# Patient Record
Sex: MALE | Race: WHITE | NOT HISPANIC OR LATINO | Employment: FULL TIME | URBAN - METROPOLITAN AREA
[De-identification: names, ages, dates, MRNs, and addresses within clinical notes are randomized per-mention and may not be internally consistent; named-entity substitution may affect disease eponyms.]

---

## 2023-06-01 ENCOUNTER — OFFICE VISIT (OUTPATIENT)
Dept: INTERNAL MEDICINE | Facility: CLINIC | Age: 43
End: 2023-06-01
Payer: MEDICAID

## 2023-06-01 VITALS
BODY MASS INDEX: 20.41 KG/M2 | SYSTOLIC BLOOD PRESSURE: 120 MMHG | TEMPERATURE: 97.3 F | OXYGEN SATURATION: 96 % | HEART RATE: 61 BPM | WEIGHT: 154 LBS | DIASTOLIC BLOOD PRESSURE: 72 MMHG | HEIGHT: 73 IN

## 2023-06-01 DIAGNOSIS — Z13.29 SCREENING FOR THYROID DISORDER: ICD-10-CM

## 2023-06-01 DIAGNOSIS — Z00.00 ENCOUNTER FOR MEDICAL EXAMINATION TO ESTABLISH CARE: Primary | ICD-10-CM

## 2023-06-01 DIAGNOSIS — F17.219 CIGARETTE NICOTINE DEPENDENCE WITH NICOTINE-INDUCED DISORDER: ICD-10-CM

## 2023-06-01 DIAGNOSIS — F33.0 MILD EPISODE OF RECURRENT MAJOR DEPRESSIVE DISORDER: ICD-10-CM

## 2023-06-01 DIAGNOSIS — Z11.59 NEED FOR HEPATITIS C SCREENING TEST: ICD-10-CM

## 2023-06-01 DIAGNOSIS — Z00.00 ANNUAL PHYSICAL EXAM: ICD-10-CM

## 2023-06-01 DIAGNOSIS — Z71.6 ENCOUNTER FOR TOBACCO USE CESSATION COUNSELING: ICD-10-CM

## 2023-06-01 DIAGNOSIS — K21.9 GASTROESOPHAGEAL REFLUX DISEASE WITHOUT ESOPHAGITIS: ICD-10-CM

## 2023-06-01 DIAGNOSIS — Z13.220 SCREENING FOR LIPID DISORDERS: ICD-10-CM

## 2023-06-01 DIAGNOSIS — J43.9 PULMONARY EMPHYSEMA, UNSPECIFIED EMPHYSEMA TYPE: ICD-10-CM

## 2023-06-01 LAB
ALBUMIN SERPL-MCNC: 4.5 G/DL (ref 3.5–5.2)
ALBUMIN/GLOB SERPL: 1.9 G/DL
ALP SERPL-CCNC: 76 U/L (ref 39–117)
ALT SERPL W P-5'-P-CCNC: 10 U/L (ref 1–41)
ANION GAP SERPL CALCULATED.3IONS-SCNC: 12.8 MMOL/L (ref 5–15)
AST SERPL-CCNC: 17 U/L (ref 1–40)
BASOPHILS # BLD AUTO: 0.05 10*3/MM3 (ref 0–0.2)
BASOPHILS NFR BLD AUTO: 0.3 % (ref 0–1.5)
BILIRUB SERPL-MCNC: 0.3 MG/DL (ref 0–1.2)
BUN SERPL-MCNC: 14 MG/DL (ref 6–20)
BUN/CREAT SERPL: 18.9 (ref 7–25)
CALCIUM SPEC-SCNC: 9.7 MG/DL (ref 8.6–10.5)
CHLORIDE SERPL-SCNC: 102 MMOL/L (ref 98–107)
CHOLEST SERPL-MCNC: 206 MG/DL (ref 0–200)
CO2 SERPL-SCNC: 21.2 MMOL/L (ref 22–29)
CREAT SERPL-MCNC: 0.74 MG/DL (ref 0.76–1.27)
DEPRECATED RDW RBC AUTO: 41.4 FL (ref 37–54)
EGFRCR SERPLBLD CKD-EPI 2021: 114.6 ML/MIN/1.73
EOSINOPHIL # BLD AUTO: 0.02 10*3/MM3 (ref 0–0.4)
EOSINOPHIL NFR BLD AUTO: 0.1 % (ref 0.3–6.2)
ERYTHROCYTE [DISTWIDTH] IN BLOOD BY AUTOMATED COUNT: 12 % (ref 12.3–15.4)
GLOBULIN UR ELPH-MCNC: 2.4 GM/DL
GLUCOSE SERPL-MCNC: 98 MG/DL (ref 65–99)
HCT VFR BLD AUTO: 42.9 % (ref 37.5–51)
HDLC SERPL-MCNC: 33 MG/DL (ref 40–60)
HGB BLD-MCNC: 14.9 G/DL (ref 13–17.7)
IMM GRANULOCYTES # BLD AUTO: 0.06 10*3/MM3 (ref 0–0.05)
IMM GRANULOCYTES NFR BLD AUTO: 0.4 % (ref 0–0.5)
LDLC SERPL CALC-MCNC: 146 MG/DL (ref 0–100)
LDLC/HDLC SERPL: 4.35 {RATIO}
LYMPHOCYTES # BLD AUTO: 2.24 10*3/MM3 (ref 0.7–3.1)
LYMPHOCYTES NFR BLD AUTO: 15.6 % (ref 19.6–45.3)
MCH RBC QN AUTO: 32.9 PG (ref 26.6–33)
MCHC RBC AUTO-ENTMCNC: 34.7 G/DL (ref 31.5–35.7)
MCV RBC AUTO: 94.7 FL (ref 79–97)
MONOCYTES # BLD AUTO: 0.62 10*3/MM3 (ref 0.1–0.9)
MONOCYTES NFR BLD AUTO: 4.3 % (ref 5–12)
NEUTROPHILS NFR BLD AUTO: 11.37 10*3/MM3 (ref 1.7–7)
NEUTROPHILS NFR BLD AUTO: 79.3 % (ref 42.7–76)
NRBC BLD AUTO-RTO: 0 /100 WBC (ref 0–0.2)
PLATELET # BLD AUTO: 170 10*3/MM3 (ref 140–450)
PMV BLD AUTO: 11.5 FL (ref 6–12)
POTASSIUM SERPL-SCNC: 4.3 MMOL/L (ref 3.5–5.2)
PROT SERPL-MCNC: 6.9 G/DL (ref 6–8.5)
RBC # BLD AUTO: 4.53 10*6/MM3 (ref 4.14–5.8)
SODIUM SERPL-SCNC: 136 MMOL/L (ref 136–145)
TRIGL SERPL-MCNC: 148 MG/DL (ref 0–150)
TSH SERPL DL<=0.05 MIU/L-ACNC: 0.53 UIU/ML (ref 0.27–4.2)
VLDLC SERPL-MCNC: 27 MG/DL (ref 5–40)
WBC NRBC COR # BLD: 14.36 10*3/MM3 (ref 3.4–10.8)

## 2023-06-01 PROCEDURE — 80053 COMPREHEN METABOLIC PANEL: CPT | Performed by: NURSE PRACTITIONER

## 2023-06-01 PROCEDURE — 80061 LIPID PANEL: CPT | Performed by: NURSE PRACTITIONER

## 2023-06-01 PROCEDURE — 86803 HEPATITIS C AB TEST: CPT | Performed by: NURSE PRACTITIONER

## 2023-06-01 PROCEDURE — 84443 ASSAY THYROID STIM HORMONE: CPT | Performed by: NURSE PRACTITIONER

## 2023-06-01 PROCEDURE — 85025 COMPLETE CBC W/AUTO DIFF WBC: CPT | Performed by: NURSE PRACTITIONER

## 2023-06-01 RX ORDER — NICOTINE 21-14-7MG
1 KIT TRANSDERMAL TAKE AS DIRECTED
Qty: 56 EACH | Refills: 0 | Status: SHIPPED | OUTPATIENT
Start: 2023-06-01

## 2023-06-01 RX ORDER — OMEPRAZOLE 20 MG/1
20 CAPSULE, DELAYED RELEASE ORAL DAILY
Qty: 90 CAPSULE | Refills: 1 | Status: SHIPPED | OUTPATIENT
Start: 2023-06-01

## 2023-06-01 RX ORDER — BUDESONIDE AND FORMOTEROL FUMARATE DIHYDRATE 80; 4.5 UG/1; UG/1
2 AEROSOL RESPIRATORY (INHALATION)
Qty: 10.2 G | Refills: 0 | Status: SHIPPED | OUTPATIENT
Start: 2023-06-01

## 2023-06-01 NOTE — PROGRESS NOTES
Chief Complaint  Establish Care, Back Pain, and Neck Pain (Started in the last few days)    Subjective          Wero Mahmood presents to [facility]  Nicotine Dependence  Presents for initial visit. Symptoms include cravings, decreased concentration, fatigue, insomnia and irritability. Symptoms are negative for headache and sore throat. Preferred tobacco types include cigarettes. Preferred cigarette types include filtered. Preferred strength is regular. His urge triggers include company of smokers and contact with substance. He smokes 1 pack of cigarettes per day. He started smoking when he was 11-12 years old. Treatments tried: cold turkey. The treatment provided no relief. Wero is ready to quit.   Depression  Visit Type: initial  Patient presents with the following symptoms: decreased concentration, depressed mood, fatigue, insomnia, irritability and nervousness/anxiety.  Patient is not experiencing: anhedonia, chest pain, choking sensation, compulsions, confusion, dizziness, dry mouth, excessive worry, feelings of hopelessness, feelings of worthlessness, hypersomnia, hyperventilation, impotence, malaise, memory impairment, muscle tension, nausea, obsessions, palpitations, panic, psychomotor agitation, psychomotor retardation, restlessness, shortness of breath, suicidal ideas, suicidal planning, thoughts of death, weight gain and weight loss.   Severity: mild   No history of: anemia    Heartburn  He complains of heartburn. He reports no abdominal pain, no belching, no chest pain, no choking, no coughing, no dysphagia, no early satiety, no globus sensation, no hoarse voice, no nausea, no sore throat, no stridor, no tooth decay, no water brash or no wheezing. This is a chronic problem. The heartburn is located in the substernum. Associated symptoms include fatigue. Pertinent negatives include no anemia, melena, muscle weakness, orthopnea or weight loss.     Previous  "PCP: Can't remember - has not been to PCP in 7 years.   Specialist(s): None  COVID vaccine: Refused  Pneumonia vaccine: Refused   Colon cancer screening: Hx of Colonoscopy - completed 8-9 years ago. Results showed he had colon polyps.     Father passed form lung cancer         Current Outpatient Medications   Medication Instructions    albuterol sulfate  (90 Base) MCG/ACT inhaler 2 puffs, Inhalation, Every 4 Hours PRN    budesonide-formoterol (Symbicort) 80-4.5 MCG/ACT inhaler 2 puffs, Inhalation, 2 Times Daily - RT    Nicotine 21-14-7 MG/24HR kit 1 kit, Transdermal, Take As Directed    nicotine polacrilex (NICORETTE) 4 mg, Mouth/Throat, As Needed    omeprazole (PRILOSEC) 20 mg, Oral, Daily    sertraline (Zoloft) 50 MG tablet Take 1/2 tab daily PO x 5 days then increase to 1 tab PO daily       The following portions of the patient's history were reviewed and updated as appropriate: allergies, current medications, past family history, past medical history, past social history, past surgical history, and problem list.    Objective   Vital Signs:   /72 (BP Location: Left arm, Patient Position: Sitting, Cuff Size: Large Adult)   Pulse 61   Temp 97.3 °F (36.3 °C) (Temporal)   Ht 185.4 cm (73\")   Wt 69.9 kg (154 lb)   SpO2 96%   BMI 20.32 kg/m²     Wt Readings from Last 3 Encounters:   06/01/23 69.9 kg (154 lb)   05/30/23 77.1 kg (170 lb)     BP Readings from Last 3 Encounters:   06/01/23 120/72   05/30/23 120/73     Physical Exam  Pulmonary:      Breath sounds: Wheezing (mild expiratory wheeze bilaterally) present.      Appearance: No acute distress, well-nourished  Head: normocephalic, atraumatic  Eyes: extraocular movements intact, no scleral icterus, no conjunctival injection  Ears, Nose, and Throat: external ears normal, nares patent, moist mucous membranes  Cardiovascular: regular rate and rhythm. no murmurs, rubs, or gallops. no edema  Respiratory: breathing comfortably, symmetric chest rise, " clear to auscultation bilaterally. No wheezes, rales, or rhonchi.  Neuro: alert and oriented to time, place, and person. Normal gait  Psych: normal mood and affect     Result Review :   The following data was reviewed by: VERONA Sanchez on 06/01/2023:  Common labs          6/1/2023    09:12   Common Labs   Glucose 98    BUN 14    Creatinine 0.74    Sodium 136    Potassium 4.3    Chloride 102    Calcium 9.7    Albumin 4.5    Total Bilirubin 0.3    Alkaline Phosphatase 76    AST (SGOT) 17    ALT (SGPT) 10    WBC 14.36    Hemoglobin 14.9    Hematocrit 42.9    Platelets 170    Total Cholesterol 206    Triglycerides 148    HDL Cholesterol 33    LDL Cholesterol  146             Lab Results   Component Value Date    SARSANTIGEN Not Detected 05/30/2023    RAPFLUA Negative 05/30/2023    RAPFLUB Negative 05/30/2023    FLUAAG Not Detected 01/12/2018    FLUBAG Not Detected 01/12/2018    RAPSCRN Not Detected 01/12/2018       Procedures        Assessment and Plan    Diagnoses and all orders for this visit:    1. Encounter for medical examination to establish care (Primary)    2. Screening for thyroid disorder  -     TSH Rfx On Abnormal To Free T4    3. Screening for lipid disorders  -     Lipid Panel    4. Need for hepatitis C screening test  -     HCV Antibody Rfx To Qnt PCR  -     Interpretation:    5. Annual physical exam  -     CBC & Differential  -     Comprehensive Metabolic Panel    6. Cigarette nicotine dependence with nicotine-induced disorder  -     Nicotine 21-14-7 MG/24HR kit; Place 1 kit on the skin as directed by provider Take As Directed.  Dispense: 56 each; Refill: 0  -     nicotine polacrilex (NICORETTE) 4 MG gum; Chew 1 each As Needed for Smoking Cessation.  Dispense: 100 each; Refill: 1    7. Mild episode of recurrent major depressive disorder  Assessment & Plan:  Patient's depression is recurrent and is mild without psychosis. Their depression is currently active and the condition is newly  identified. This will be reassessed in 4 weeks. F/U as described:patient was prescribed an antidepressant medicine.    Orders:  -     sertraline (Zoloft) 50 MG tablet; Take 1/2 tab daily PO x 5 days then increase to 1 tab PO daily  Dispense: 30 tablet; Refill: 1    8. Gastroesophageal reflux disease without esophagitis  -     omeprazole (priLOSEC) 20 MG capsule; Take 1 capsule by mouth Daily.  Dispense: 90 capsule; Refill: 1    9. Pulmonary emphysema, unspecified emphysema type  -     Spirometry - Pre & Post Bronchodilator with Diffusion Capacity; Future  -     budesonide-formoterol (Symbicort) 80-4.5 MCG/ACT inhaler; Inhale 2 puffs 2 (Two) Times a Day.  Dispense: 10.2 g; Refill: 0    10. Encounter for tobacco use cessation counseling      Wero Mahmood  reports that he has been smoking cigarettes. He has never used smokeless tobacco.. I have educated him on the risk of diseases from using tobacco products such as cancer, COPD, heart disease and cataracts.     I advised him to quit and he is willing to quit. We have discussed the following method/s for tobacco cessation:  Education Material Prescription Medicaiton.  Together we have set a quit date for 1 month from today.  He will follow up with me in 4 weeks or sooner to check on his progress.    I spent 5 minutes counseling the patient.       Advised on diet, physical activity, sunscreen, helmet, texting and driving, etc    There are no discontinued medications.       Follow Up   Return in about 4 weeks (around 6/29/2023) for Depression; smoking .  Patient was given instructions and counseling regarding his condition or for health maintenance advice. Please see specific information pulled into the AVS if appropriate.       Yeny Almeida, APRN  06/06/23  12:52 EDT

## 2023-06-03 LAB
HCV AB SERPL QL IA: NORMAL
HCV IGG SERPL QL IA: NON REACTIVE

## 2023-06-07 ENCOUNTER — TELEPHONE (OUTPATIENT)
Dept: INTERNAL MEDICINE | Facility: CLINIC | Age: 43
End: 2023-06-07
Payer: MEDICAID

## 2023-06-07 DIAGNOSIS — E78.2 MIXED HYPERLIPIDEMIA: Primary | ICD-10-CM

## 2023-06-08 RX ORDER — ATORVASTATIN CALCIUM 40 MG/1
40 TABLET, FILM COATED ORAL NIGHTLY
Qty: 90 TABLET | Refills: 1 | Status: SHIPPED | OUTPATIENT
Start: 2023-06-08

## 2023-06-08 NOTE — TELEPHONE ENCOUNTER
Caller: PAUL NELSON    Relationship: Self    Best call back number: [phone]    What is the best time to reach you: ANY     Who are you requesting to speak with (clinical staff, provider,  specific staff member): CLINICAL     Do you know the name of the person who called: JOSE R     What was the call regarding: PATIENTS WIFE RETURNING CALL FROM OFFICE. WIFE ON [facility]. WAS INFORMED OF LAB RESULTS AND AGREES TO CHOLESTEROL MEDICATION BEING CALLED INTO [facility]. PLEASE ADVISE.       
----- Message from VERONA Sanchez sent at 6/7/2023  4:16 PM EDT -----  Cholesterol levels are elevated.  Based on patient's ASCVD risk score it is recommended that patient be on cholesterol-lowering medication.  Is he agreeable?    The 10-year ASCVD risk score (Andrea SHEPPARD, et al., 2019) is: 8%    Blood cell count is slightly elevated.  We will continue to monitor.  Other labs reassuring  
Attempted to contact patient. Left message to call the office back.     HUB OK TO READ/ADVISE:  Cholesterol levels are elevated.  Based on patient's ASCVD risk score it is recommended that patient be on cholesterol-lowering medication.  Is he agreeable?     The 10-year ASCVD risk score (Andrea SHEPPARD, et al., 2019) is: 8%     Blood cell count is slightly elevated.  We will continue to monitor.  Other labs reassuring  
Sent to pharmacy   
negative...

## 2023-06-12 ENCOUNTER — HOSPITAL ENCOUNTER (OUTPATIENT)
Dept: RESPIRATORY THERAPY | Facility: HOSPITAL | Age: 43
Discharge: HOME | End: 2023-06-12
Admitting: NURSE PRACTITIONER
Payer: MEDICAID

## 2023-06-12 DIAGNOSIS — J43.9 PULMONARY EMPHYSEMA, UNSPECIFIED EMPHYSEMA TYPE: ICD-10-CM

## 2023-06-12 PROCEDURE — 94060 EVALUATION OF WHEEZING: CPT

## 2023-06-12 PROCEDURE — 94729 DIFFUSING CAPACITY: CPT

## 2023-06-12 RX ORDER — LEVALBUTEROL INHALATION SOLUTION 1.25 MG/3ML
1.25 SOLUTION RESPIRATORY (INHALATION) ONCE
Status: COMPLETED | OUTPATIENT
Start: 2023-06-12 | End: 2023-06-12

## 2023-06-12 RX ADMIN — LEVALBUTEROL HYDROCHLORIDE 1.25 MG: 1.25 SOLUTION RESPIRATORY (INHALATION) at 09:04

## 2023-07-25 ENCOUNTER — HOSPITAL ENCOUNTER (EMERGENCY)
Facility: HOSPITAL | Age: 43
Discharge: HOME | End: 2023-07-25
Attending: EMERGENCY MEDICINE | Admitting: EMERGENCY MEDICINE
Payer: MEDICAID

## 2023-07-25 ENCOUNTER — APPOINTMENT (OUTPATIENT)
Dept: ULTRASOUND IMAGING | Facility: HOSPITAL | Age: 43
End: 2023-07-25
Payer: MEDICAID

## 2023-07-25 ENCOUNTER — APPOINTMENT (OUTPATIENT)
Dept: GENERAL RADIOLOGY | Facility: HOSPITAL | Age: 43
End: 2023-07-25
Payer: MEDICAID

## 2023-07-25 VITALS
RESPIRATION RATE: 20 BRPM | BODY MASS INDEX: 19.81 KG/M2 | OXYGEN SATURATION: 99 % | TEMPERATURE: 98.4 F | HEART RATE: 49 BPM | HEIGHT: 73 IN | WEIGHT: 149.47 LBS | SYSTOLIC BLOOD PRESSURE: 126 MMHG | DIASTOLIC BLOOD PRESSURE: 71 MMHG

## 2023-07-25 DIAGNOSIS — K83.8 COMMON BILE DUCT DILATION: Primary | ICD-10-CM

## 2023-07-25 LAB
ALBUMIN SERPL-MCNC: 4.6 G/DL (ref 3.5–5.2)
ALBUMIN/GLOB SERPL: 1.9 G/DL
ALP SERPL-CCNC: 75 U/L (ref 39–117)
ALT SERPL W P-5'-P-CCNC: 8 U/L (ref 1–41)
ANION GAP SERPL CALCULATED.3IONS-SCNC: 12 MMOL/L (ref 5–15)
AST SERPL-CCNC: 10 U/L (ref 1–40)
BASOPHILS # BLD AUTO: 0.12 10*3/MM3 (ref 0–0.2)
BASOPHILS NFR BLD AUTO: 1 % (ref 0–1.5)
BILIRUB SERPL-MCNC: 0.9 MG/DL (ref 0–1.2)
BUN SERPL-MCNC: 12 MG/DL (ref 6–20)
BUN/CREAT SERPL: 12.8 (ref 7–25)
CALCIUM SPEC-SCNC: 9.4 MG/DL (ref 8.6–10.5)
CHLORIDE SERPL-SCNC: 105 MMOL/L (ref 98–107)
CO2 SERPL-SCNC: 23 MMOL/L (ref 22–29)
CREAT SERPL-MCNC: 0.94 MG/DL (ref 0.76–1.27)
DEPRECATED RDW RBC AUTO: 43.8 FL (ref 37–54)
EGFRCR SERPLBLD CKD-EPI 2021: 101.9 ML/MIN/1.73
EOSINOPHIL # BLD AUTO: 0.22 10*3/MM3 (ref 0–0.4)
EOSINOPHIL NFR BLD AUTO: 1.9 % (ref 0.3–6.2)
ERYTHROCYTE [DISTWIDTH] IN BLOOD BY AUTOMATED COUNT: 12.6 % (ref 12.3–15.4)
GLOBULIN UR ELPH-MCNC: 2.4 GM/DL
GLUCOSE SERPL-MCNC: 90 MG/DL (ref 65–99)
HCT VFR BLD AUTO: 45 % (ref 37.5–51)
HGB BLD-MCNC: 15.7 G/DL (ref 13–17.7)
HOLD SPECIMEN: NORMAL
HOLD SPECIMEN: NORMAL
IMM GRANULOCYTES # BLD AUTO: 0.04 10*3/MM3 (ref 0–0.05)
IMM GRANULOCYTES NFR BLD AUTO: 0.3 % (ref 0–0.5)
LIPASE SERPL-CCNC: 22 U/L (ref 13–60)
LYMPHOCYTES # BLD AUTO: 2.76 10*3/MM3 (ref 0.7–3.1)
LYMPHOCYTES NFR BLD AUTO: 23.3 % (ref 19.6–45.3)
MAGNESIUM SERPL-MCNC: 2.1 MG/DL (ref 1.6–2.6)
MCH RBC QN AUTO: 33 PG (ref 26.6–33)
MCHC RBC AUTO-ENTMCNC: 34.9 G/DL (ref 31.5–35.7)
MCV RBC AUTO: 94.5 FL (ref 79–97)
MONOCYTES # BLD AUTO: 0.36 10*3/MM3 (ref 0.1–0.9)
MONOCYTES NFR BLD AUTO: 3 % (ref 5–12)
NEUTROPHILS NFR BLD AUTO: 70.5 % (ref 42.7–76)
NEUTROPHILS NFR BLD AUTO: 8.36 10*3/MM3 (ref 1.7–7)
NRBC BLD AUTO-RTO: 0 /100 WBC (ref 0–0.2)
NT-PROBNP SERPL-MCNC: <36 PG/ML (ref 0–450)
PLATELET # BLD AUTO: 157 10*3/MM3 (ref 140–450)
PMV BLD AUTO: 10.1 FL (ref 6–12)
POTASSIUM SERPL-SCNC: 3.9 MMOL/L (ref 3.5–5.2)
PROT SERPL-MCNC: 7 G/DL (ref 6–8.5)
QT INTERVAL: 375 MS
RBC # BLD AUTO: 4.76 10*6/MM3 (ref 4.14–5.8)
SODIUM SERPL-SCNC: 140 MMOL/L (ref 136–145)
TROPONIN T SERPL HS-MCNC: 8 NG/L
WBC NRBC COR # BLD: 11.86 10*3/MM3 (ref 3.4–10.8)
WHOLE BLOOD HOLD COAG: NORMAL
WHOLE BLOOD HOLD SPECIMEN: NORMAL

## 2023-07-25 PROCEDURE — 80053 COMPREHEN METABOLIC PANEL: CPT | Performed by: EMERGENCY MEDICINE

## 2023-07-25 PROCEDURE — 93010 ELECTROCARDIOGRAM REPORT: CPT | Performed by: INTERNAL MEDICINE

## 2023-07-25 PROCEDURE — 83880 ASSAY OF NATRIURETIC PEPTIDE: CPT

## 2023-07-25 PROCEDURE — 25010000002 KETOROLAC TROMETHAMINE PER 15 MG

## 2023-07-25 PROCEDURE — 83735 ASSAY OF MAGNESIUM: CPT | Performed by: EMERGENCY MEDICINE

## 2023-07-25 PROCEDURE — 93005 ELECTROCARDIOGRAM TRACING: CPT

## 2023-07-25 PROCEDURE — 93005 ELECTROCARDIOGRAM TRACING: CPT | Performed by: EMERGENCY MEDICINE

## 2023-07-25 PROCEDURE — 76705 ECHO EXAM OF ABDOMEN: CPT

## 2023-07-25 PROCEDURE — 71045 X-RAY EXAM CHEST 1 VIEW: CPT

## 2023-07-25 PROCEDURE — 36415 COLL VENOUS BLD VENIPUNCTURE: CPT

## 2023-07-25 PROCEDURE — 99282 EMERGENCY DEPT VISIT SF MDM: CPT

## 2023-07-25 PROCEDURE — 96372 THER/PROPH/DIAG INJ SC/IM: CPT

## 2023-07-25 PROCEDURE — 84484 ASSAY OF TROPONIN QUANT: CPT | Performed by: EMERGENCY MEDICINE

## 2023-07-25 PROCEDURE — 83690 ASSAY OF LIPASE: CPT | Performed by: EMERGENCY MEDICINE

## 2023-07-25 PROCEDURE — 85025 COMPLETE CBC W/AUTO DIFF WBC: CPT

## 2023-07-25 RX ORDER — KETOROLAC TROMETHAMINE 30 MG/ML
30 INJECTION, SOLUTION INTRAMUSCULAR; INTRAVENOUS ONCE
Status: COMPLETED | OUTPATIENT
Start: 2023-07-25 | End: 2023-07-25

## 2023-07-25 RX ORDER — SODIUM CHLORIDE 0.9 % (FLUSH) 0.9 %
10 SYRINGE (ML) INJECTION AS NEEDED
Status: DISCONTINUED | OUTPATIENT
Start: 2023-07-25 | End: 2023-07-25 | Stop reason: HOSPADM

## 2023-07-25 RX ORDER — ASPIRIN 81 MG/1
324 TABLET, CHEWABLE ORAL ONCE
Status: DISCONTINUED | OUTPATIENT
Start: 2023-07-25 | End: 2023-07-25

## 2023-07-25 RX ADMIN — KETOROLAC TROMETHAMINE 30 MG: 30 INJECTION, SOLUTION INTRAMUSCULAR; INTRAVENOUS at 20:19

## 2023-07-25 NOTE — Clinical Note
[facility] EMERGENCY ROOM  [address]  [place] [address]  Phone: [phone]    Wero Mahmood was seen and treated in our emergency department on 7/25/2023.  He may return to work on 07/27/2023.         Thank you for choosing [facility].    Keith Cifuentes MD

## 2023-07-25 NOTE — ED PROVIDER NOTES
Time: 3:59 PM EDT  Date of encounter:  7/25/2023  Independent Historian/Clinical History and Information was obtained by:   Patient    History is limited by: N/A    Chief Complaint   Patient presents with    Abdominal Pain         History of Present Illness:  Patient is a 43 y.o. year old male who presents to the emergency department for evaluation of epigastric abdominal pain for 1 week.  (Provider in triage, Renan Cooper PA-C)    Patient states the epigastric abdominal pain is worse after eating.  Patient denies fever, chest pain, shortness of breath.    HPI    Patient Care Team  Primary Care Provider: Yeny Almeida APRN    Past Medical History:     Allergies   Allergen Reactions    Penicillins Hives     Past Medical History:   Diagnosis Date    GERD (gastroesophageal reflux disease)      Past Surgical History:   Procedure Laterality Date    COLONOSCOPY       History reviewed. No pertinent family history.    Home Medications:  Prior to Admission medications    Medication Sig Start Date End Date Taking? Authorizing Provider   albuterol sulfate  (90 Base) MCG/ACT inhaler Inhale 2 puffs Every 4 (Four) Hours As Needed for Wheezing or Shortness of Air. 5/30/23   Fernanda Stafford APRN   atorvastatin (LIPITOR) 40 MG tablet Take 1 tablet by mouth Every Night. 6/8/23   Yeny Almeida APRN   budesonide-formoterol (Symbicort) 80-4.5 MCG/ACT inhaler Inhale 2 puffs 2 (Two) Times a Day. 6/1/23   Yeny Almeida APRN   omeprazole (priLOSEC) 20 MG capsule Take 1 capsule by mouth Daily. 6/1/23   Yeny Almeida APRN   sertraline (Zoloft) 50 MG tablet Take 1/2 tab daily PO x 5 days then increase to 1 tab PO daily 6/1/23   Yeny Almeida APRN        Social History:   Social History     Tobacco Use    Smoking status: Some Days     Types: Cigarettes    Smokeless tobacco: Never   Vaping Use    Vaping Use: Never used   Substance Use Topics    Alcohol use: Not Currently    Drug use: Yes      "Types: Marijuana     Comment: daily         Review of Systems:  Review of Systems   Constitutional:  Negative for chills and fever.   HENT:  Negative for congestion, rhinorrhea and sore throat.    Eyes:  Negative for pain and visual disturbance.   Respiratory:  Negative for apnea, cough, chest tightness and shortness of breath.    Cardiovascular:  Negative for chest pain and palpitations.   Gastrointestinal:  Positive for abdominal pain and nausea. Negative for diarrhea and vomiting.   Genitourinary:  Negative for difficulty urinating and dysuria.   Musculoskeletal:  Negative for joint swelling and myalgias.   Skin:  Negative for color change.   Neurological:  Negative for seizures and headaches.   Psychiatric/Behavioral: Negative.     All other systems reviewed and are negative.     Physical Exam:  /78 (BP Location: Left arm, Patient Position: Sitting)   Pulse 52   Temp 99.1 °F (37.3 °C) (Oral)   Resp 18   Ht 185.4 cm (73\")   Wt 67.8 kg (149 lb 7.6 oz)   SpO2 99%   BMI 19.72 kg/m²         Physical Exam  Vitals and nursing note reviewed.   Constitutional:       General: He is not in acute distress.     Appearance: Normal appearance. He is not toxic-appearing.   HENT:      Head: Normocephalic and atraumatic.      Jaw: There is normal jaw occlusion.   Eyes:      General: Lids are normal.      Extraocular Movements: Extraocular movements intact.      Conjunctiva/sclera: Conjunctivae normal.      Pupils: Pupils are equal, round, and reactive to light.   Cardiovascular:      Rate and Rhythm: Normal rate and regular rhythm.      Pulses: Normal pulses.      Heart sounds: Normal heart sounds.   Pulmonary:      Effort: Pulmonary effort is normal. No respiratory distress.      Breath sounds: Normal breath sounds. No wheezing or rhonchi.   Abdominal:      General: Abdomen is flat. There is no distension.      Palpations: Abdomen is soft.      Tenderness: There is abdominal tenderness in the right upper quadrant. " There is no guarding or rebound.   Musculoskeletal:         General: Normal range of motion.      Cervical back: Normal range of motion and neck supple.      Right lower leg: No edema.      Left lower leg: No edema.   Skin:     General: Skin is warm and dry.      Coloration: Skin is not cyanotic.   Neurological:      Mental Status: He is alert and oriented to person, place, and time. Mental status is at baseline.   Psychiatric:         Attention and Perception: Attention and perception normal.         Mood and Affect: Mood normal.                    Procedures:  Procedures      Medical Decision Making:      Comorbidities that affect care:    GERD    External Notes reviewed:    Previous Clinic Note: 7/24/2023 for upper abdominal pain      The following orders were placed and all results were independently analyzed by me:  Orders Placed This Encounter   Procedures    XR Chest 1 View    US Gallbladder    [place] Draw    High Sensitivity Troponin T    Comprehensive Metabolic Panel    Lipase    BNP    Magnesium    CBC Auto Differential    High Sensitivity Troponin T 2Hr    NPO Diet NPO Type: Strict NPO    Undress & Gown    Insert Peripheral IV    CBC & Differential    Green Top (Gel)    Lavender Top    Gold Top - SST    Light Blue Top       Medications Given in the Emergency Department:  Medications   sodium chloride 0.9 % flush 10 mL (has no administration in time range)   ketorolac (TORADOL) injection 30 mg (has no administration in time range)        ED Course:    The patient was initially evaluated in the triage area where orders were placed. The patient was later dispositioned by Keith Dangelo PA-C.      The patient was advised to stay for completion of workup which includes but is not limited to communication of labs and radiological results, reassessment and plan. The patient was advised that leaving prior to disposition by a provider could result in critical findings that are not communicated to the patient.      ED Course as of 07/25/23 2018 Tue Jul 25, 2023   1601 PROVIDER IN TRIAGE  Patient was evaluated by me in triage, Renan Cooper PA-C.  Orders were placed and patient is currently awaiting final results and disposition.  [MD]      ED Course User Index  [MD] Renan Cooper PA-C       Labs:    Lab Results (last 24 hours)       Procedure Component Value Units Date/Time    High Sensitivity Troponin T [[id number]]  (Normal) Collected: 07/25/23 1609    Specimen: Blood from Arm, Right Updated: 07/25/23 1717     HS Troponin T 8 ng/L     Narrative:      High Sensitive Troponin T Reference Range:  <10.0 ng/L- Negative Female for AMI  <15.0 ng/L- Negative Male for AMI  >=10 - Abnormal Female indicating possible myocardial injury.  >=15 - Abnormal Male indicating possible myocardial injury.   Clinicians would have to utilize clinical acumen, EKG, Troponin, and serial changes to determine if it is an Acute Myocardial Infarction or myocardial injury due to an underlying chronic condition.         CBC & Differential [[id number]]  (Abnormal) Collected: 07/25/23 1609    Specimen: Blood from Arm, Right Updated: 07/25/23 1634    Narrative:      The following orders were created for panel order CBC & Differential.  Procedure                               Abnormality         Status                     ---------                               -----------         ------                     CBC Auto Differential[[id number]]        Abnormal            Final result                 Please view results for these tests on the individual orders.    Comprehensive Metabolic Panel [[id number]] Collected: 07/25/23 1609    Specimen: Blood from Arm, Right Updated: 07/25/23 1717     Glucose 90 mg/dL      BUN 12 mg/dL      Creatinine 0.94 mg/dL      Sodium 140 mmol/L      Potassium 3.9 mmol/L      Chloride 105 mmol/L      CO2 23.0 mmol/L      Calcium 9.4 mg/dL      Total Protein 7.0 g/dL      Albumin 4.6 g/dL      ALT (SGPT) 8 U/L      AST (SGOT)  10 U/L      Alkaline Phosphatase 75 U/L      Total Bilirubin 0.9 mg/dL      Globulin 2.4 gm/dL      A/G Ratio 1.9 g/dL      BUN/Creatinine Ratio 12.8     Anion Gap 12.0 mmol/L      eGFR 101.9 mL/min/1.73     Narrative:      GFR Normal >60  Chronic Kidney Disease <60  Kidney Failure <15      Lipase [[id number]]  (Normal) Collected: 07/25/23 1609    Specimen: Blood from Arm, Right Updated: 07/25/23 1717     Lipase 22 U/L     BNP [[id number]]  (Normal) Collected: 07/25/23 1609    Specimen: Blood from Arm, Right Updated: 07/25/23 1708     proBNP <36.0 pg/mL     Narrative:      Among patients with dyspnea, NT-proBNP is highly sensitive for the detection of acute congestive heart failure. In addition NT-proBNP of <300 pg/ml effectively rules out acute congestive heart failure with 99% negative predictive value.      Magnesium [[id number]]  (Normal) Collected: 07/25/23 1609    Specimen: Blood from Arm, Right Updated: 07/25/23 1717     Magnesium 2.1 mg/dL     CBC Auto Differential [[id number]]  (Abnormal) Collected: 07/25/23 1609    Specimen: Blood from Arm, Right Updated: 07/25/23 1634     WBC 11.86 10*3/mm3      RBC 4.76 10*6/mm3      Hemoglobin 15.7 g/dL      Hematocrit 45.0 %      MCV 94.5 fL      MCH 33.0 pg      MCHC 34.9 g/dL      RDW 12.6 %      RDW-SD 43.8 fl      MPV 10.1 fL      Platelets 157 10*3/mm3      Neutrophil % 70.5 %      Lymphocyte % 23.3 %      Monocyte % 3.0 %      Eosinophil % 1.9 %      Basophil % 1.0 %      Immature Grans % 0.3 %      Neutrophils, Absolute 8.36 10*3/mm3      Lymphocytes, Absolute 2.76 10*3/mm3      Monocytes, Absolute 0.36 10*3/mm3      Eosinophils, Absolute 0.22 10*3/mm3      Basophils, Absolute 0.12 10*3/mm3      Immature Grans, Absolute 0.04 10*3/mm3      nRBC 0.0 /100 WBC              Imaging:    US Gallbladder    Result Date: 7/25/2023  PROCEDURE: US GALLBLADDER  COMPARISON:  INDICATIONS: RUQ pain after eating  TECHNIQUE: A limited ultrasound examination of the abdominal right  upper quadrant was performed.   FINDINGS:  No focal hepatic lesion is identified.  Hepatic echogenicity appears within normal limits.  The main portal vein is patent and demonstrates hepatopetal directional flow.  The gallbladder appears normal with no evidence of gallstone or inflammatory change.  The common duct is prominent measuring 0.7 cm in diameter.  The distal aspect of the duct is obscured by overlying bowel.  The imaged portions of the pancreas and right kidney appear normal.  No ascites is demonstrated.  The patient had a negative sonographic Gomez sign.          1. Borderline dilatation of the common duct.  Suggest correlation with a bilirubin level.  If elevated, consider MRCP to further evaluate. 2. No sonographic evidence of cholecystitis     Malachi Castle M.D.       Electronically Signed and Approved By: Malachi Castle M.D. on 7/25/2023 at 19:58             XR Chest 1 View    Result Date: 7/25/2023  PROCEDURE: XR CHEST 1 VW  COMPARISON: [facility] CECILIA Hassan, XR CHEST 2 VW, 5/30/2023, 20:14.  INDICATIONS: Chest Pain Triage Protocol/LEFT SIDED CHEST PAIN STARTING A FEW DAYS AGO  FINDINGS:  The lungs appear clear bilaterally.  The cardiac and mediastinal silhouettes appear normal.  No effusion is identified.  No pneumothorax or fracture is identified.        1. No acute cardiopulmonary disease       Malachi Castle M.D.       Electronically Signed and Approved By: Malachi Castle M.D. on 7/25/2023 at 17:27                Differential Diagnosis and Discussion:      Abdominal Pain: Based on the patient's signs and symptoms, I considered abdominal aortic aneurysm, small bowel obstruction, pancreatitis, acute cholecystitis, acute appendecitis, peptic ulcer disease, gastritis, colitis, endocrine disorders, irritable bowel syndrome and other differential diagnosis an etiology of the patient's abdominal pain.    All labs were reviewed and interpreted by me.  EKG was interpreted by supervising  attending.  Ultrasound impression was interpreted by me.     MDM     Amount and/or Complexity of Data Reviewed  Clinical lab tests: reviewed  Tests in the radiology section of CPT®: reviewed  Tests in the medicine section of CPT®: reviewed       Present with the patient's gallbladder shows Borderline dilatation of the common duct.  Suggesting correlation with a bilirubin level.  Patient's bilirubin level is 0.9 right now.  There is no sonographic evidence of cholecystitis the radiologist.  I instructed the patient and family to follow-up with a primary care provider tomorrow to have his CMP redrawn that includes his liver enzymes as well as his bilirubin.  If the patient becomes confused, spikes a fever, develops severe abdominal pain, or profuse nausea and vomiting then the patient should return to the ER.  Patient's family and patient's state they understand and agree with the plan of care.      Patient Care Considerations:    None      Consultants/Shared Management Plan:    I spoke with Dr. Montgomery regarding CT read and patient's labs and condition.  He wants the patient to follow-up with her primary care provider tomorrow to have serial labs drawn.  He states that if the patient's condition worsens as mentioned above the patient is to return to the ER immediately.    Social Determinants of Health:    Patient is independent, reliable, and has access to care.       Disposition and Care Coordination:    Discharged: I considered escalation of care by admitting this patient to the hospital, however the patient has improved and is suitable and stable for discharge.    I have explained the patient´s condition, diagnoses and treatment plan based on the information available to me at this time. I have answered questions and addressed any concerns. The patient has a good  understanding of the patient´s diagnosis, condition, and treatment plan as can be expected at this point. The vital signs have been stable. The patient´s  condition is stable and appropriate for discharge from the emergency department.      The patient will pursue further outpatient evaluation with the primary care physician or other designated or consulting physician as outlined in the discharge instructions. They are agreeable to this plan of care and follow-up instructions have been explained in detail. The patient has received these instructions in written format and have expressed an understanding of the discharge instructions. The patient is aware that any significant change in condition or worsening of symptoms should prompt an immediate return to this or the closest emergency department or call to 911.  I have explained discharge medications and the need for follow up with the patient/caretakers. This was also printed in the discharge instructions. Patient was discharged with the following medications and follow up:      Medication List      No changes were made to your prescriptions during this visit.      Yeny Almeida, APRN  [address] [address]  [phone]    Go in 1 day  Serial blood draw       Final diagnoses:   Common bile duct dilation        ED Disposition       ED Disposition   Discharge    Condition   Stable    Comment   --               This medical record created using voice recognition software.             Keith Dangelo PA-C  07/25/23 2018

## 2023-07-26 NOTE — DISCHARGE INSTRUCTIONS
Return to the ER if you develop worsening abdominal pain, confusion, or spike a fever.  The patient's family notices patient becomes jaundice that should also return the patient to the ER.

## 2023-10-04 ENCOUNTER — HOSPITAL ENCOUNTER (EMERGENCY)
Facility: HOSPITAL | Age: 43
Discharge: HOME | End: 2023-10-05
Attending: EMERGENCY MEDICINE
Payer: MEDICAID

## 2023-10-04 ENCOUNTER — APPOINTMENT (OUTPATIENT)
Dept: GENERAL RADIOLOGY | Facility: HOSPITAL | Age: 43
End: 2023-10-04
Payer: MEDICAID

## 2023-10-04 VITALS
HEIGHT: 73 IN | SYSTOLIC BLOOD PRESSURE: 123 MMHG | BODY MASS INDEX: 19.05 KG/M2 | OXYGEN SATURATION: 100 % | RESPIRATION RATE: 17 BRPM | HEART RATE: 48 BPM | DIASTOLIC BLOOD PRESSURE: 75 MMHG | TEMPERATURE: 98.1 F | WEIGHT: 143.74 LBS

## 2023-10-04 DIAGNOSIS — R00.1 SINUS BRADYCARDIA: ICD-10-CM

## 2023-10-04 DIAGNOSIS — R07.89 CHEST DISCOMFORT: Primary | ICD-10-CM

## 2023-10-04 DIAGNOSIS — R09.1 PLEURISY: ICD-10-CM

## 2023-10-04 LAB
ALBUMIN SERPL-MCNC: 4.5 G/DL (ref 3.5–5.2)
ALBUMIN/GLOB SERPL: 2 G/DL
ALP SERPL-CCNC: 74 U/L (ref 39–117)
ALT SERPL W P-5'-P-CCNC: 9 U/L (ref 1–41)
ANION GAP SERPL CALCULATED.3IONS-SCNC: 7.2 MMOL/L (ref 5–15)
AST SERPL-CCNC: 12 U/L (ref 1–40)
BASOPHILS # BLD AUTO: 0.1 10*3/MM3 (ref 0–0.2)
BASOPHILS NFR BLD AUTO: 0.9 % (ref 0–1.5)
BILIRUB SERPL-MCNC: 0.3 MG/DL (ref 0–1.2)
BUN SERPL-MCNC: 14 MG/DL (ref 6–20)
BUN/CREAT SERPL: 14.4 (ref 7–25)
CALCIUM SPEC-SCNC: 9.4 MG/DL (ref 8.6–10.5)
CHLORIDE SERPL-SCNC: 105 MMOL/L (ref 98–107)
CO2 SERPL-SCNC: 28.8 MMOL/L (ref 22–29)
CREAT SERPL-MCNC: 0.97 MG/DL (ref 0.76–1.27)
DEPRECATED RDW RBC AUTO: 44.8 FL (ref 37–54)
EGFRCR SERPLBLD CKD-EPI 2021: 98.1 ML/MIN/1.73
EOSINOPHIL # BLD AUTO: 0.51 10*3/MM3 (ref 0–0.4)
EOSINOPHIL NFR BLD AUTO: 4.8 % (ref 0.3–6.2)
ERYTHROCYTE [DISTWIDTH] IN BLOOD BY AUTOMATED COUNT: 12.5 % (ref 12.3–15.4)
GLOBULIN UR ELPH-MCNC: 2.2 GM/DL
GLUCOSE SERPL-MCNC: 96 MG/DL (ref 65–99)
HCT VFR BLD AUTO: 43.9 % (ref 37.5–51)
HGB BLD-MCNC: 14.7 G/DL (ref 13–17.7)
HOLD SPECIMEN: NORMAL
HOLD SPECIMEN: NORMAL
IMM GRANULOCYTES # BLD AUTO: 0.03 10*3/MM3 (ref 0–0.05)
IMM GRANULOCYTES NFR BLD AUTO: 0.3 % (ref 0–0.5)
LIPASE SERPL-CCNC: 34 U/L (ref 13–60)
LYMPHOCYTES # BLD AUTO: 4.32 10*3/MM3 (ref 0.7–3.1)
LYMPHOCYTES NFR BLD AUTO: 40.9 % (ref 19.6–45.3)
MAGNESIUM SERPL-MCNC: 2.2 MG/DL (ref 1.6–2.6)
MCH RBC QN AUTO: 32.6 PG (ref 26.6–33)
MCHC RBC AUTO-ENTMCNC: 33.5 G/DL (ref 31.5–35.7)
MCV RBC AUTO: 97.3 FL (ref 79–97)
MONOCYTES # BLD AUTO: 0.43 10*3/MM3 (ref 0.1–0.9)
MONOCYTES NFR BLD AUTO: 4.1 % (ref 5–12)
NEUTROPHILS NFR BLD AUTO: 49 % (ref 42.7–76)
NEUTROPHILS NFR BLD AUTO: 5.18 10*3/MM3 (ref 1.7–7)
NRBC BLD AUTO-RTO: 0 /100 WBC (ref 0–0.2)
NT-PROBNP SERPL-MCNC: <36 PG/ML (ref 0–450)
PLATELET # BLD AUTO: 161 10*3/MM3 (ref 140–450)
PMV BLD AUTO: 10.2 FL (ref 6–12)
POTASSIUM SERPL-SCNC: 4.6 MMOL/L (ref 3.5–5.2)
PROT SERPL-MCNC: 6.7 G/DL (ref 6–8.5)
RBC # BLD AUTO: 4.51 10*6/MM3 (ref 4.14–5.8)
SODIUM SERPL-SCNC: 141 MMOL/L (ref 136–145)
TROPONIN T SERPL HS-MCNC: 8 NG/L
WBC NRBC COR # BLD: 10.57 10*3/MM3 (ref 3.4–10.8)
WHOLE BLOOD HOLD COAG: NORMAL
WHOLE BLOOD HOLD SPECIMEN: NORMAL

## 2023-10-04 PROCEDURE — 25010000002 KETOROLAC TROMETHAMINE PER 15 MG: Performed by: EMERGENCY MEDICINE

## 2023-10-04 PROCEDURE — 93005 ELECTROCARDIOGRAM TRACING: CPT | Performed by: EMERGENCY MEDICINE

## 2023-10-04 PROCEDURE — 83690 ASSAY OF LIPASE: CPT

## 2023-10-04 PROCEDURE — 85025 COMPLETE CBC W/AUTO DIFF WBC: CPT

## 2023-10-04 PROCEDURE — 84484 ASSAY OF TROPONIN QUANT: CPT

## 2023-10-04 PROCEDURE — 83880 ASSAY OF NATRIURETIC PEPTIDE: CPT

## 2023-10-04 PROCEDURE — 93005 ELECTROCARDIOGRAM TRACING: CPT

## 2023-10-04 PROCEDURE — 93010 ELECTROCARDIOGRAM REPORT: CPT | Performed by: INTERNAL MEDICINE

## 2023-10-04 PROCEDURE — 96372 THER/PROPH/DIAG INJ SC/IM: CPT

## 2023-10-04 PROCEDURE — 71045 X-RAY EXAM CHEST 1 VIEW: CPT

## 2023-10-04 PROCEDURE — 83735 ASSAY OF MAGNESIUM: CPT

## 2023-10-04 PROCEDURE — 36415 COLL VENOUS BLD VENIPUNCTURE: CPT

## 2023-10-04 PROCEDURE — 99284 EMERGENCY DEPT VISIT MOD MDM: CPT

## 2023-10-04 PROCEDURE — 80053 COMPREHEN METABOLIC PANEL: CPT

## 2023-10-04 RX ORDER — SODIUM CHLORIDE 0.9 % (FLUSH) 0.9 %
10 SYRINGE (ML) INJECTION AS NEEDED
Status: DISCONTINUED | OUTPATIENT
Start: 2023-10-04 | End: 2023-10-05 | Stop reason: HOSPADM

## 2023-10-04 RX ORDER — ASPIRIN 81 MG/1
324 TABLET, CHEWABLE ORAL ONCE
Status: COMPLETED | OUTPATIENT
Start: 2023-10-04 | End: 2023-10-04

## 2023-10-04 RX ORDER — KETOROLAC TROMETHAMINE 30 MG/ML
60 INJECTION, SOLUTION INTRAMUSCULAR; INTRAVENOUS ONCE
Status: COMPLETED | OUTPATIENT
Start: 2023-10-04 | End: 2023-10-04

## 2023-10-04 RX ADMIN — KETOROLAC TROMETHAMINE 60 MG: 30 INJECTION, SOLUTION INTRAMUSCULAR at 23:12

## 2023-10-04 RX ADMIN — ASPIRIN 81 MG CHEWABLE TABLET 324 MG: 81 TABLET CHEWABLE at 22:00

## 2023-10-04 NOTE — Clinical Note
[facility] EMERGENCY ROOM  [address]  [place] [address]  Phone: [phone]    Wero Mahmood was seen and treated in our emergency department on 10/4/2023.  He may return to work on 10/09/2023.         Thank you for choosing [facility].    Keith Miller,

## 2023-10-05 LAB
GEN 5 2HR TROPONIN T REFLEX: 7 NG/L
TROPONIN T DELTA: -1 NG/L

## 2023-10-05 PROCEDURE — 84484 ASSAY OF TROPONIN QUANT: CPT | Performed by: EMERGENCY MEDICINE

## 2023-10-05 RX ORDER — IBUPROFEN 600 MG/1
600 TABLET ORAL EVERY 8 HOURS PRN
Qty: 21 TABLET | Refills: 0 | Status: SHIPPED | OUTPATIENT
Start: 2023-10-05 | End: 2023-10-12

## 2023-10-05 NOTE — DISCHARGE INSTRUCTIONS
No strenuous activity until released by the cardiologist.    Please start on a daily baby aspirin.     Please return to the emergency room for worsening chest pain, radiating chest pain, shortness of breath, near passing out, passing out, unusual fatigue, unusual sweating, nausea or vomiting or new or worrisome symptoms

## 2023-10-05 NOTE — ED PROVIDER NOTES
Time: 11:21 PM EDT  Date of encounter:  10/4/2023  Independent Historian/Clinical History and Information was obtained by:   Patient  Chief Complaint: Chest pain    History is limited by: N/A    History of Present Illness:  Patient is a 43 y.o. year old male who presents to the emergency department for evaluation of chest pain.  The patient states that he began having chest pain around 730 this evening.  He locates it on the left side of the chest.  There is no radiation of the pain.  The patient has no pain in the neck jaw down the arms or back.  The patient states that the pain worsens with deep inspiration.  He describes the pain as constant but it does get sharp with deep inspiration.  Patient's had a slight cough.  The patient denies any shortness of breath.  Patient denies any diaphoresis.  The patient has no nausea.  The patient has no near-syncope or syncope.  The patient has no unusual fatigue.  The patient denies any abdominal pain.  Patient does smoke.  The patient does have a history of high cholesterol.  The patient does have a family history of coronary disease.  Patient does not have a history of hypertension or diabetes.  The patient's had no previous history of pulmonary embolism or DVT.  The patient's had no recent immobilization, traumatic event or surgery in the last 6 to 8 weeks.  The patient does not have cancer.  The patient has no clinical signs of DVT at this time such as unilateral calf pain or leg swelling.    HPI    Patient Care Team  Primary Care Provider: Yeny Almeida APRN    Past Medical History:     Allergies   Allergen Reactions    Penicillins Hives     Past Medical History:   Diagnosis Date    GERD (gastroesophageal reflux disease)      Past Surgical History:   Procedure Laterality Date    COLONOSCOPY       No family history on file.    Home Medications:  Prior to Admission medications    Medication Sig Start Date End Date Taking? Authorizing Provider   albuterol sulfate   (90 Base) MCG/ACT inhaler Inhale 2 puffs Every 4 (Four) Hours As Needed for Wheezing or Shortness of Air. 5/30/23   Fernanda Stafford APRN   atorvastatin (LIPITOR) 40 MG tablet Take 1 tablet by mouth Every Night. 6/8/23   Yeny Almeida APRN   budesonide-formoterol (Symbicort) 80-4.5 MCG/ACT inhaler Inhale 2 puffs 2 (Two) Times a Day. 6/1/23   Yeny Almeida APRN   omeprazole (priLOSEC) 20 MG capsule Take 1 capsule by mouth Daily. 6/1/23   Yeny Almeida APRN   sertraline (Zoloft) 50 MG tablet Take 1/2 tab daily PO x 5 days then increase to 1 tab PO daily 6/1/23   Yeny Almeida APRN        Social History:   Social History     Tobacco Use    Smoking status: Some Days     Types: Cigarettes    Smokeless tobacco: Never   Vaping Use    Vaping Use: Never used   Substance Use Topics    Alcohol use: Not Currently    Drug use: Yes     Types: Marijuana     Comment: daily         Review of Systems:  Review of Systems   Constitutional:  Negative for chills, diaphoresis and fever.   HENT:  Negative for congestion, postnasal drip, rhinorrhea and sore throat.    Eyes:  Negative for photophobia.   Respiratory:  Positive for cough. Negative for chest tightness and shortness of breath.    Cardiovascular:  Positive for chest pain. Negative for palpitations and leg swelling.   Gastrointestinal:  Negative for abdominal pain, diarrhea, nausea and vomiting.   Genitourinary:  Negative for difficulty urinating, dysuria, flank pain, frequency, hematuria and urgency.   Musculoskeletal:  Negative for neck pain and neck stiffness.   Skin:  Negative for pallor and rash.   Neurological:  Negative for dizziness, syncope, weakness, numbness and headaches.   Hematological:  Negative for adenopathy. Does not bruise/bleed easily.   Psychiatric/Behavioral: Negative.        Physical Exam:  /75 (BP Location: Left arm, Patient Position: Sitting)   Pulse (!) 48   Temp 98.1 °F (36.7 °C) (Oral)   Resp 17   Ht  "185.4 cm (73\")   Wt 65.2 kg (143 lb 11.8 oz)   SpO2 100%   BMI 18.96 kg/m²     Physical Exam  Vitals and nursing note reviewed.   Constitutional:       General: He is not in acute distress.     Appearance: Normal appearance. He is not ill-appearing, toxic-appearing or diaphoretic.   HENT:      Head: Normocephalic and atraumatic.      Mouth/Throat:      Mouth: Mucous membranes are moist.   Eyes:      Pupils: Pupils are equal, round, and reactive to light.   Cardiovascular:      Rate and Rhythm: Normal rate and regular rhythm.      Pulses: Normal pulses.           Carotid pulses are 2+ on the right side and 2+ on the left side.       Radial pulses are 2+ on the right side and 2+ on the left side.        Femoral pulses are 2+ on the right side and 2+ on the left side.       Popliteal pulses are 2+ on the right side and 2+ on the left side.        Dorsalis pedis pulses are 2+ on the right side and 2+ on the left side.        Posterior tibial pulses are 2+ on the right side and 2+ on the left side.      Heart sounds: Normal heart sounds. No murmur heard.  Pulmonary:      Effort: Pulmonary effort is normal. No accessory muscle usage, respiratory distress or retractions.      Breath sounds: Examination of the right-upper field reveals decreased breath sounds and wheezing. Examination of the left-upper field reveals decreased breath sounds and wheezing. Examination of the right-middle field reveals decreased breath sounds. Examination of the left-middle field reveals decreased breath sounds. Examination of the right-lower field reveals decreased breath sounds. Examination of the left-lower field reveals decreased breath sounds. Decreased breath sounds present. No wheezing, rhonchi or rales.   Chest:      Chest wall: No mass or tenderness.      Comments: The patient's pain does increase with deep inspiration  Abdominal:      General: Abdomen is flat. There is no distension.      Palpations: Abdomen is soft. There is no " mass or pulsatile mass.      Tenderness: There is no abdominal tenderness. There is no right CVA tenderness, left CVA tenderness, guarding or rebound.      Comments: No rigidity   Musculoskeletal:         General: No swelling, tenderness or deformity.      Cervical back: Neck supple. No tenderness.      Right lower leg: No tenderness. No edema.      Left lower leg: No tenderness. No edema.   Skin:     General: Skin is warm and dry.      Capillary Refill: Capillary refill takes less than 2 seconds.      Coloration: Skin is not jaundiced or pale.      Findings: No erythema.   Neurological:      General: No focal deficit present.      Mental Status: He is alert and oriented to person, place, and time. Mental status is at baseline.      Cranial Nerves: Cranial nerves 2-12 are intact. No cranial nerve deficit.      Sensory: Sensation is intact. No sensory deficit.      Motor: Motor function is intact. No weakness or pronator drift.      Coordination: Coordination is intact. Coordination normal.   Psychiatric:         Mood and Affect: Mood normal.         Behavior: Behavior normal.                Procedures:  Procedures      Medical Decision Making:      Comorbidities that affect care:    GERD, high cholesterol, current smoker    External Notes reviewed:    None      The following orders were placed and all results were independently analyzed by me:  Orders Placed This Encounter   Procedures    XR Chest 1 View    [place] Draw    High Sensitivity Troponin T    Comprehensive Metabolic Panel    Lipase    BNP    Magnesium    CBC Auto Differential    High Sensitivity Troponin T 2Hr    NPO Diet NPO Type: Strict NPO    Undress & Gown    Continuous Pulse Oximetry    Oxygen Therapy- Nasal Cannula; Titrate 1-6 LPM Per SpO2; 90 - 95%    ECG 12 Lead ED Triage Standing Order; Chest Pain    ECG 12 Lead ED Triage Standing Order; Chest Pain    Insert Peripheral IV    CBC & Differential    Green Top (Gel)    Lavender Top    Gold Top - SST     Light Blue Top       Medications Given in the Emergency Department:  Medications   sodium chloride 0.9 % flush 10 mL (has no administration in time range)   aspirin chewable tablet 324 mg (324 mg Oral Given 10/4/23 2200)   ketorolac (TORADOL) injection 60 mg (60 mg Intramuscular Given 10/4/23 2312)        ED Course:    ED Course as of 10/05/23 0100   Wed Oct 04, 2023   2124 EKG:    Rhythm: Sinus bradycardia  Rate: 46  Intervals: Borderline short SD interval and normal QT interval  T-wave: No pathological T waves  ST Segment: No pathological ST elevation or reciprocal ST depression to suggest STEMI    EKG Comparison: There is no change in the QRS and an ST morphology from the EKG that was performed July 25, 2023    Interpreted by me   [SD]   2352 EKG:    Rhythm: Sinus bradycardia  Rate: 41  Intervals: Normal SD and QT interval  T-wave: Isolated nonspecific T wave inversion in V2  ST Segment: No pathological ST elevation or reciprocal ST depression to suggest STEMI    EKG Comparison: There is no change in the QRS and ST morphology from the EKG that was performed earlier in the department    Interpreted by me   [SD]      ED Course User Index  [SD] Keith Miller DO       Labs:    Lab Results (last 24 hours)       Procedure Component Value Units Date/Time    High Sensitivity Troponin T [[id number]]  (Normal) Collected: 10/04/23 2129    Specimen: Blood Updated: 10/04/23 2209     HS Troponin T 8 ng/L     Narrative:      High Sensitive Troponin T Reference Range:  <10.0 ng/L- Negative Female for AMI  <15.0 ng/L- Negative Male for AMI  >=10 - Abnormal Female indicating possible myocardial injury.  >=15 - Abnormal Male indicating possible myocardial injury.   Clinicians would have to utilize clinical acumen, EKG, Troponin, and serial changes to determine if it is an Acute Myocardial Infarction or myocardial injury due to an underlying chronic condition.         CBC & Differential [[id number]]  (Abnormal) Collected: 10/04/23  2129    Specimen: Blood Updated: 10/04/23 2140    Narrative:      The following orders were created for panel order CBC & Differential.  Procedure                               Abnormality         Status                     ---------                               -----------         ------                     CBC Auto Differential[[id number]]        Abnormal            Final result                 Please view results for these tests on the individual orders.    Comprehensive Metabolic Panel [[id number]] Collected: 10/04/23 2129    Specimen: Blood Updated: 10/04/23 2209     Glucose 96 mg/dL      BUN 14 mg/dL      Creatinine 0.97 mg/dL      Sodium 141 mmol/L      Potassium 4.6 mmol/L      Chloride 105 mmol/L      CO2 28.8 mmol/L      Calcium 9.4 mg/dL      Total Protein 6.7 g/dL      Albumin 4.5 g/dL      ALT (SGPT) 9 U/L      AST (SGOT) 12 U/L      Alkaline Phosphatase 74 U/L      Total Bilirubin 0.3 mg/dL      Globulin 2.2 gm/dL      A/G Ratio 2.0 g/dL      BUN/Creatinine Ratio 14.4     Anion Gap 7.2 mmol/L      eGFR 98.1 mL/min/1.73     Narrative:      GFR Normal >60  Chronic Kidney Disease <60  Kidney Failure <15      Lipase [[id number]]  (Normal) Collected: 10/04/23 2129    Specimen: Blood Updated: 10/04/23 2209     Lipase 34 U/L     BNP [[id number]]  (Normal) Collected: 10/04/23 2129    Specimen: Blood Updated: 10/04/23 2206     proBNP <36.0 pg/mL     Narrative:      This assay is used as an aid in the diagnosis of individuals suspected of having heart failure. It can be used as an aid in the diagnosis of acute decompensated heart failure (ADHF) in patients presenting with signs and symptoms of ADHF to the emergency department (ED). In addition, NT-proBNP of <300 pg/mL indicates ADHF is not likely.    Age Range Result Interpretation  NT-proBNP Concentration (pg/mL:      <50             Positive            >450                   Gray                 300-450                    Negative             <300    50-75            Positive            >900                  Gray                300-900                  Negative            <300      >75             Positive            >1800                  Gray                300-1800                  Negative            <300    Magnesium [[id number]]  (Normal) Collected: 10/04/23 2129    Specimen: Blood Updated: 10/04/23 2209     Magnesium 2.2 mg/dL     CBC Auto Differential [[id number]]  (Abnormal) Collected: 10/04/23 2129    Specimen: Blood Updated: 10/04/23 2140     WBC 10.57 10*3/mm3      RBC 4.51 10*6/mm3      Hemoglobin 14.7 g/dL      Hematocrit 43.9 %      MCV 97.3 fL      MCH 32.6 pg      MCHC 33.5 g/dL      RDW 12.5 %      RDW-SD 44.8 fl      MPV 10.2 fL      Platelets 161 10*3/mm3      Neutrophil % 49.0 %      Lymphocyte % 40.9 %      Monocyte % 4.1 %      Eosinophil % 4.8 %      Basophil % 0.9 %      Immature Grans % 0.3 %      Neutrophils, Absolute 5.18 10*3/mm3      Lymphocytes, Absolute 4.32 10*3/mm3      Monocytes, Absolute 0.43 10*3/mm3      Eosinophils, Absolute 0.51 10*3/mm3      Basophils, Absolute 0.10 10*3/mm3      Immature Grans, Absolute 0.03 10*3/mm3      nRBC 0.0 /100 WBC     High Sensitivity Troponin T 2Hr [[id number]]  (Normal) Collected: 10/05/23 0004    Specimen: Blood Updated: 10/05/23 0027     HS Troponin T 7 ng/L      Troponin T Delta -1 ng/L     Narrative:      High Sensitive Troponin T Reference Range:  <10.0 ng/L- Negative Female for AMI  <15.0 ng/L- Negative Male for AMI  >=10 - Abnormal Female indicating possible myocardial injury.  >=15 - Abnormal Male indicating possible myocardial injury.   Clinicians would have to utilize clinical acumen, EKG, Troponin, and serial changes to determine if it is an Acute Myocardial Infarction or myocardial injury due to an underlying chronic condition.                  Imaging:    XR Chest 1 View    Result Date: 10/4/2023  PROCEDURE: XR CHEST 1 VW  COMPARISONS: 7/25/2023; 5/30/2023.  INDICATIONS: CHEST PAIN.  FINDINGS:  A  single AP (or PA) upright portable chest radiograph was performed.  No cardiac enlargement is seen.  No acute infiltrate is appreciated.  No pleural effusion or pneumothorax is identified.  No significant interval change is seen since the prior study (or studies).        No acute infiltrate is appreciated.     Please note that portions of this note were completed with a voice recognition program.  JAZMIN SHEFFIELD JR, MD       Electronically Signed and Approved By: JAZMIN SHEFFIELD JR, MD on 10/04/2023 at 22:36                 Differential Diagnosis and Discussion:    Chest Pain:  Based on the patient's signs and symptoms, I considered aortic dissection, myocardial infaction, pulmonary embolism, cardiac tamponade, pericarditis, pneumothorax, musculoskeletal chest pain and other differential diagnosis as an etiology of the patient's chest pain.     All labs were reviewed and interpreted by me.  All X-rays impressions were independently interpreted by me.  EKG was interpreted by me.    MDM  Number of Diagnoses or Management Options  Chest discomfort  Pleurisy  Sinus bradycardia  Diagnosis management comments:     PERC Rule for Pulmonary Embolism - MDCalc  Calculated on Oct 04 2023 11:25 PM  0 criteria -> No need for further workup, as <2% chance of PE. If no criteria are positive and clinician's pre-test probability is <15%, PERC Rule criteria are satisfied.    The patient's PERC score was negative.  I have discussed with the patient that they have a very low risk for pulmonary embolism.  I have discussed possibly performing a CAT scan of the chest with IV contrast to rule out pulmonary embolism.  However, due to the fact that the patient is very low risk for pulmonary embolism, they would prefer not to have a CAT scan of the chest at this time due to radiation exposure.    HEART Score for Major Cardiac Events - MDCalc  Calculated on Oct 04 2023 11:26 PM  3 points -> Low Score (0-3 points) Risk of MACE of 0.9-1.7%.    The  patient had 2 high sensitivity troponins that were within normal limits.  The patient had 2 EKGs that demonstrated no evidence of ST segment elevation MI or other injury pattern    The patient appears well, in no distress and nontoxic.  The patient is resting comfortably.  The patient has a low heart score.  I have discussed the significance of the heart score with them.  The patient understands that they have a low risk for cardiovascular event over the next 6 weeks.  Understanding the risks, they feel comfortable to be discharged home and to follow-up with the cardiologist on an outpatient basis for stress test.   In the interim, the patient does understand should they develop worsening chest pain, near syncope, syncope, extreme fatigue, worsening shortness of breath or diaphoresis to return back to emergency room immediately.         Amount and/or Complexity of Data Reviewed  Clinical lab tests: reviewed  Tests in the radiology section of CPT®: reviewed  Tests in the medicine section of CPT®: reviewed           Social Determinants of Health:    Patient is independent, reliable, and has access to care.       Disposition and Care Coordination:    Discharged: The patient is suitable and stable for discharge with no need for consideration of observation or admission.    I have explained discharge medications and the need for follow up with the patient/caretakers. This was also printed in the discharge instructions. Patient was discharged with the following medications and follow up:      Medication List        New Prescriptions      ibuprofen 600 MG tablet  Commonly known as: ADVIL,MOTRIN  Take 1 tablet by mouth Every 8 (Eight) Hours As Needed for Mild Pain for up to 7 days.               Where to Get Your Medications        These medications [facility] [place] [place] [facility] [address] [phone] [facility] [phone] [address], [facility] [address]      Phone:  [phone]   ibuprofen 600 MG tablet      Yeny Almeida, APRN  [address] [facility]  [address]  [place] [address]  [phone]    On 10/6/2023  Chest discomfort, pleurisy, call for appointment    Murphy Finley MD  [address]   [address]  [place] [address]  [phone]    On 10/6/2023  Chest discomfort, sinus bradycardia, call for appointment       Final diagnoses:   Chest discomfort   Pleurisy   Sinus bradycardia        ED Disposition       ED Disposition   Discharge    Condition   Stable    Comment   --               This medical record created using voice recognition software.             Keith Miller DO  10/05/23 0100

## 2023-10-06 LAB
QT INTERVAL: 413 MS
QT INTERVAL: 440 MS
QTC INTERVAL: 361 MS
QTC INTERVAL: 362 MS

## 2024-04-10 ENCOUNTER — TELEPHONE (OUTPATIENT)
Dept: INTERNAL MEDICINE | Facility: CLINIC | Age: 44
End: 2024-04-10
Payer: MEDICAID

## 2024-04-10 NOTE — TELEPHONE ENCOUNTER
[facility] called in regards to patient. Inquiring about patient. Informed we had only seen him one time for establishment of care. Inquired if he had any known heart problems, informed them that there is nothing documented that patient has had issues. It did look like he had Cardiology appointments that he did not go to.     No further questions or concerns were noted.